# Patient Record
Sex: FEMALE | Race: WHITE | Employment: UNEMPLOYED | URBAN - METROPOLITAN AREA
[De-identification: names, ages, dates, MRNs, and addresses within clinical notes are randomized per-mention and may not be internally consistent; named-entity substitution may affect disease eponyms.]

---

## 2017-08-24 ENCOUNTER — HOSPITAL ENCOUNTER (OUTPATIENT)
Dept: ULTRASOUND IMAGING | Age: 37
Discharge: HOME | End: 2017-08-24
Attending: INTERNAL MEDICINE
Payer: MEDICARE

## 2017-08-24 DIAGNOSIS — R74.8 ELEVATED LIVER ENZYMES: ICD-10-CM

## 2017-08-24 PROCEDURE — 0346T US ABD LTD W ELASTOGRAPHY: CPT

## 2022-11-08 RX ORDER — BUPROPION HYDROCHLORIDE 300 MG/1
300 TABLET ORAL DAILY
COMMUNITY

## 2022-11-08 NOTE — PERIOP NOTES
[address] [place] [address]  SURGICAL PRE-ADMISSION INSTRUCTIONS    ARRIVAL  You will be called the day before your surgery with your expected arrival time. Sign in at the  of the hospital.  You will be directed to the Surgical Waiting Room. Please arrive at your scheduled appointment time. You have been scheduled to arrive for your procedure one or two hours prior to the expected start time of your procedure. Every effort will be made to minimize your wait but please be aware that unforeseen circumstances may affect our schedule. EATING  DO NOT EAT OR DRINK ANYTHING AFTER MIDNIGHT ON THE EVENING BEFORE YOUR SURGERY OR ON THE DAY OF YOUR SURGERY except for your medications (as instructed) with a sip of water. Do not use gum, mints or lozenges on the morning of your surgery. Please do not smoke or chew tobacco before your surgery. MEDICATIONS   Carvedilol, Depakote, Prilosec    STOP THESE MEDICATIONS AT THE TIMES LISTED BELOW  none     DRIVING/TRANSPORATION  Have a responsible adult to drive you home from the hospital and to stay with you over night. Please have them plan to remain in the hospital during your surgery. Your surgery will not be done if you do not have a responsible adult to take you home and to stay with you. If you have arranged for public transport, you must have a responsible adult to ride with you (who is not the ). You may not drive for 24 hours after anesthesia. PREPARATION  If you have a Living WiIl/Advance Directive, please bring a copy with you to scan into your chart. Please DO NOT wear makeup or nail polish  Please leave valuables at home,  DO NOT wear jewelry. Wear loose, comfortable clothing that is large enough to cover a bulky dressing.     SPECIAL INSTRUCTIONS:  none    Reviewed above preoperative instructions and answered questions by phone interview    Patient:  Nelida Luis   Date:     November 8, 2022  Time:   11:17 AM    RN: Ulysses Sil, RN    Date:     November 8, 2022  Time:   11:17 AM

## 2022-11-13 PROBLEM — N87.1 HIGH GRADE SQUAMOUS INTRAEPITHELIAL LESION (HGSIL), GRADE 2 CIN, ON BIOPSY OF CERVIX: Status: ACTIVE | Noted: 2022-11-13

## 2022-11-14 ENCOUNTER — ANESTHESIA EVENT (OUTPATIENT)
Dept: SURGERY | Age: 42
End: 2022-11-14
Payer: MEDICARE

## 2022-11-14 ENCOUNTER — HOSPITAL ENCOUNTER (OUTPATIENT)
Age: 42
Setting detail: OUTPATIENT SURGERY
Discharge: HOME | End: 2022-11-14
Attending: OBSTETRICS & GYNECOLOGY | Admitting: OBSTETRICS & GYNECOLOGY
Payer: MEDICARE

## 2022-11-14 ENCOUNTER — ANESTHESIA (OUTPATIENT)
Dept: SURGERY | Age: 42
End: 2022-11-14
Payer: MEDICARE

## 2022-11-14 VITALS
WEIGHT: 160 LBS | OXYGEN SATURATION: 97 % | SYSTOLIC BLOOD PRESSURE: 101 MMHG | TEMPERATURE: 98 F | HEART RATE: 77 BPM | BODY MASS INDEX: 29.44 KG/M2 | HEIGHT: 62 IN | DIASTOLIC BLOOD PRESSURE: 48 MMHG | RESPIRATION RATE: 20 BRPM

## 2022-11-14 LAB — HCG UR QL: NEGATIVE

## 2022-11-14 PROCEDURE — 2709999900 HC NON-CHARGEABLE SUPPLY: Performed by: OBSTETRICS & GYNECOLOGY

## 2022-11-14 PROCEDURE — 74011250636 HC RX REV CODE- 250/636: Performed by: OBSTETRICS & GYNECOLOGY

## 2022-11-14 PROCEDURE — 77030020268 HC MISC GENERAL SUPPLY: Performed by: OBSTETRICS & GYNECOLOGY

## 2022-11-14 PROCEDURE — 74011250636 HC RX REV CODE- 250/636: Performed by: ANESTHESIOLOGY

## 2022-11-14 PROCEDURE — 77030010432 HC ELECTRD BALL COVD -B: Performed by: OBSTETRICS & GYNECOLOGY

## 2022-11-14 PROCEDURE — 74011000250 HC RX REV CODE- 250: Performed by: OBSTETRICS & GYNECOLOGY

## 2022-11-14 PROCEDURE — 77030040361 HC SLV COMPR DVT MDII -B: Performed by: OBSTETRICS & GYNECOLOGY

## 2022-11-14 PROCEDURE — 81025 URINE PREGNANCY TEST: CPT

## 2022-11-14 PROCEDURE — 76010000138 HC OR TIME 0.5 TO 1 HR: Performed by: OBSTETRICS & GYNECOLOGY

## 2022-11-14 PROCEDURE — 76060000032 HC ANESTHESIA 0.5 TO 1 HR: Performed by: OBSTETRICS & GYNECOLOGY

## 2022-11-14 PROCEDURE — 77030040922 HC BLNKT HYPOTHRM STRY -A: Performed by: OBSTETRICS & GYNECOLOGY

## 2022-11-14 PROCEDURE — 76210000006 HC OR PH I REC 0.5 TO 1 HR: Performed by: OBSTETRICS & GYNECOLOGY

## 2022-11-14 PROCEDURE — 88307 TISSUE EXAM BY PATHOLOGIST: CPT

## 2022-11-14 PROCEDURE — 88305 TISSUE EXAM BY PATHOLOGIST: CPT

## 2022-11-14 PROCEDURE — 77030013620: Performed by: OBSTETRICS & GYNECOLOGY

## 2022-11-14 RX ORDER — BUPIVACAINE HYDROCHLORIDE AND EPINEPHRINE 2.5; 5 MG/ML; UG/ML
20 INJECTION, SOLUTION EPIDURAL; INFILTRATION; INTRACAUDAL; PERINEURAL ONCE
Status: DISCONTINUED | OUTPATIENT
Start: 2022-11-14 | End: 2022-11-14 | Stop reason: HOSPADM

## 2022-11-14 RX ORDER — TRAZODONE HYDROCHLORIDE 100 MG/1
100 TABLET ORAL
COMMUNITY
Start: 2022-09-27

## 2022-11-14 RX ORDER — FUROSEMIDE 20 MG/1
20 TABLET ORAL DAILY
COMMUNITY

## 2022-11-14 RX ORDER — MIDAZOLAM HYDROCHLORIDE 1 MG/ML
INJECTION, SOLUTION INTRAMUSCULAR; INTRAVENOUS
Status: DISCONTINUED
Start: 2022-11-14 | End: 2022-11-14 | Stop reason: HOSPADM

## 2022-11-14 RX ORDER — MIDAZOLAM HYDROCHLORIDE 1 MG/ML
INJECTION, SOLUTION INTRAMUSCULAR; INTRAVENOUS AS NEEDED
Status: DISCONTINUED | OUTPATIENT
Start: 2022-11-14 | End: 2022-11-14 | Stop reason: HOSPADM

## 2022-11-14 RX ORDER — BUPIVACAINE HYDROCHLORIDE 2.5 MG/ML
10 INJECTION, SOLUTION EPIDURAL; INFILTRATION; INTRACAUDAL ONCE
Status: COMPLETED | OUTPATIENT
Start: 2022-11-14 | End: 2022-11-14

## 2022-11-14 RX ORDER — FENTANYL CITRATE 50 UG/ML
INJECTION, SOLUTION INTRAMUSCULAR; INTRAVENOUS
Status: DISCONTINUED
Start: 2022-11-14 | End: 2022-11-14 | Stop reason: HOSPADM

## 2022-11-14 RX ORDER — SODIUM CHLORIDE 0.9 % (FLUSH) 0.9 %
5-40 SYRINGE (ML) INJECTION EVERY 8 HOURS
Status: DISCONTINUED | OUTPATIENT
Start: 2022-11-14 | End: 2022-11-14 | Stop reason: HOSPADM

## 2022-11-14 RX ORDER — SODIUM CHLORIDE, SODIUM LACTATE, POTASSIUM CHLORIDE, CALCIUM CHLORIDE 600; 310; 30; 20 MG/100ML; MG/100ML; MG/100ML; MG/100ML
25 INJECTION, SOLUTION INTRAVENOUS CONTINUOUS
Status: DISCONTINUED | OUTPATIENT
Start: 2022-11-14 | End: 2022-11-14 | Stop reason: HOSPADM

## 2022-11-14 RX ORDER — KETOROLAC TROMETHAMINE 15 MG/ML
INJECTION, SOLUTION INTRAMUSCULAR; INTRAVENOUS
Status: DISCONTINUED
Start: 2022-11-14 | End: 2022-11-14 | Stop reason: HOSPADM

## 2022-11-14 RX ORDER — FENTANYL CITRATE 50 UG/ML
INJECTION, SOLUTION INTRAMUSCULAR; INTRAVENOUS AS NEEDED
Status: DISCONTINUED | OUTPATIENT
Start: 2022-11-14 | End: 2022-11-14 | Stop reason: HOSPADM

## 2022-11-14 RX ORDER — SODIUM CHLORIDE 0.9 % (FLUSH) 0.9 %
5-40 SYRINGE (ML) INJECTION AS NEEDED
Status: DISCONTINUED | OUTPATIENT
Start: 2022-11-14 | End: 2022-11-14 | Stop reason: HOSPADM

## 2022-11-14 RX ORDER — PROPOFOL 10 MG/ML
INJECTION, EMULSION INTRAVENOUS
Status: DISCONTINUED | OUTPATIENT
Start: 2022-11-14 | End: 2022-11-14 | Stop reason: HOSPADM

## 2022-11-14 RX ORDER — OMEPRAZOLE 20 MG/1
20 CAPSULE, DELAYED RELEASE ORAL DAILY
COMMUNITY
Start: 2022-10-24

## 2022-11-14 RX ORDER — KETOROLAC TROMETHAMINE 15 MG/ML
30 INJECTION, SOLUTION INTRAMUSCULAR; INTRAVENOUS
Status: COMPLETED | OUTPATIENT
Start: 2022-11-14 | End: 2022-11-14

## 2022-11-14 RX ORDER — PROPOFOL 10 MG/ML
INJECTION, EMULSION INTRAVENOUS
Status: DISCONTINUED
Start: 2022-11-14 | End: 2022-11-14 | Stop reason: HOSPADM

## 2022-11-14 RX ADMIN — PROPOFOL 150 MCG/KG/MIN: 10 INJECTION, EMULSION INTRAVENOUS at 10:11

## 2022-11-14 RX ADMIN — MIDAZOLAM 2 MG: 1 INJECTION INTRAMUSCULAR; INTRAVENOUS at 10:07

## 2022-11-14 RX ADMIN — KETOROLAC TROMETHAMINE 30 MG: 15 INJECTION, SOLUTION INTRAMUSCULAR; INTRAVENOUS at 11:15

## 2022-11-14 RX ADMIN — FENTANYL CITRATE 50 MCG: 50 INJECTION, SOLUTION INTRAMUSCULAR; INTRAVENOUS at 10:09

## 2022-11-14 RX ADMIN — SODIUM CHLORIDE, POTASSIUM CHLORIDE, SODIUM LACTATE AND CALCIUM CHLORIDE 25 ML/HR: 600; 310; 30; 20 INJECTION, SOLUTION INTRAVENOUS at 09:04

## 2022-11-14 RX ADMIN — FENTANYL CITRATE 50 MCG: 50 INJECTION, SOLUTION INTRAMUSCULAR; INTRAVENOUS at 10:08

## 2022-11-14 NOTE — OP NOTES
[facility]  OPERATIVE REPORT    Name:  Sergey Denis  MR#:  [id number]  :  1980  ACCOUNT #:  [de-identified]  DATE OF SERVICE:  2022    PREOPERATIVE DIAGNOSIS:  Cervical intraepithelial neoplasia II. POSTOPERATIVE DIAGNOSIS:  Cervical intraepithelial neoplasia II. PROCEDURE PERFORMED:  LEEP procedure of cervix and endocervical curettage. SURGEON:  Yannick Betancourt MD    ASSISTANT:  None. ANESTHESIA:  MAC/paracervical block utilizing 0.25% Marcaine without epinephrine. COMPLICATIONS:  None. SPECIMENS REMOVED:  1.  Endocervical curettings. 2.  Ectocervical cone suture at 12 o'clock. 3.  Left lower quadrant ectocervical cone suture at 7 o'clock. 4.  Upper endocervical cone suture at 12 o'clock. 5.  Lower endocervical cone suture at 6 o'clock. IMPLANTS:  None. ESTIMATED BLOOD LOSS:  Minimal.    CONSULTS:  None. DRAINS:  None. FINDINGS:  No loss of staining with application of Lugol's. INDICATIONS:  At the office at our preoperative visit, we discussed the risks of surgery including infection; bleeding; damage to bowel, bladder, and adjacent organs secondary to the LEEP procedure. We discussed risk of nerve injury secondary to leg placement. We discussed the rare risk of death. We discussed limitations in that the disease can extend beyond the cone edges or beyond the area of staining from Lugol's. Alternatives were discussed. Questions were answered and permit was signed. PROCEDURE:  The patient was taken to the operating room and placed on the operating table. MAC anesthesia was administered and she was gotten into a comfortable position utilizing the Yellofin leg holders putting her in dorsal lithotomy position. She was prepped with Betadine vaginally, vulvar, and perineally, and draped in a normal fashion for this procedure. LEEP speculum was placed. The LEEP vaginal wall retractors were placed.   Lugol's was then used to stain the cervix with the above-noted findings and then utilizing the 20 x 10 loop, LEEP performed ectocervical loop. The left lower quadrant of the cervix as the surgeon viewed needed further extension, so that same loop was used in that area and this was tagged with a suture at 7 o'clock. Utilizing a 10 x 10 loop, LEEP was performed of the endocervix. It was necessary to extend to the lower portion of the endocervical canal utilizing second pass with the 10 x 10 loop. These were sent separately. Endocervical curettage was performed, and the procedure was ended. Anesthesia reversed. She was undraped and retractors removed and carefully taken out of dorsal lithotomy position and ultimately taken to the PACU in stable condition. Needle, sponge, and instrument counts correct x2.       Endy Ayoub MD      [id number]  D:  11/14/2022 11:04  T:  11/14/2022 17:11  JOB #:  [id number]  CC:  Terrea Meckel, MD

## 2022-11-14 NOTE — ANESTHESIA PREPROCEDURE EVALUATION
Relevant Problems   No relevant active problems       Anesthetic History               Review of Systems / Medical History      Pulmonary                   Neuro/Psych              Cardiovascular              Pacemaker      Comments: History of LVEF 15% many years ago  Now 50% EF   Fair activity level   GI/Hepatic/Renal                Endo/Other             Other Findings              Physical Exam    Airway  Mallampati: II           Cardiovascular    Rhythm: regular           Dental         Pulmonary  Breath sounds clear to auscultation               Abdominal         Other Findings            Anesthetic Plan    ASA: 3  Anesthesia type: MAC          Induction: Intravenous  Anesthetic plan and risks discussed with: Patient

## 2022-11-14 NOTE — BRIEF OP NOTE
Brief Postoperative Note    Patient: Angie Callejas  YOB: 1980  MRN: [id number]    Date of Procedure: 11/14/2022     Pre-Op Diagnosis: Cervical dysplasia [N87.9]    Post-Op Diagnosis: Same as preoperative diagnosis. Procedure(s):  LOOP ELECTRODE EXCISION PROCEDURE OF CERVIX (LEEP) (ANES. CHOICE)    Surgeon(s):  Consuelo Maddox MD    Surgical Assistant: None    Anesthesia: MAC, paracervical block using 10 mL of 0.25% marcaine without epinephrine    Estimated Blood Loss (mL): Minimal    Complications: None    Specimens:   ID Type Source Tests Collected by Time Destination   1 : Endocervial curettings Preservative Cervical  Consuelo Maddox MD 11/14/2022 1042 Pathology   2 : ectocervical suture @12  Preservative Cervical  Consuelo Maddox MD 11/14/2022 1050 Pathology    3. Llq as surgeon looks at specimen, suture 7 o'clock  4 upper endocervical cone suture at 12 o'clock     5.  Lower endocervical cone, suture at 6 o'clock    Implants: * No implants in log *    Drains: * No LDAs found *    Findings: no loss of staining    Dictation confirmation number [id number]    Electronically Signed by Roseanna Dodd MD on 11/14/2022 at 10:57 AM

## 2022-11-14 NOTE — PROGRESS NOTES
I saw patient prior to procedure today. She is doing well without complaint, no change in PMH, PSH, Allergies, Medications, ROS or exam.  She is in agreement with planned procedure today. Questions answered. The patient was counseled at length about the risks of golden Covid-19 during their perioperative period and any recovery window from their procedure. The patient was made aware that golden Covid-19  may worsen their prognosis for recovering from their procedure and lend to a higher morbidity and/or mortality risk. All material risks, benefits, and reasonable alternatives including postponing the procedure were discussed. The patient does  wish to proceed with the procedure at this time.

## 2022-11-14 NOTE — H&P
Chief Complaint  CESAR 2 to 3 @ 12 o'clock    HPI  Rm 1  Patient presents for LEEP as treatment and further diagnosis of CESAR 2 to 3 @ 12 o'clock of the cervix found at colposcopy    8/2021 LGSIL HPV (+) 16(+) 18/45 (-) -> 10/2021 Colpol CESAR 2 10 o'clock CESAR 2-3 12 o'clock ECC (-) -> LEEP 11/14/22 9:30am RGJERROD Benavides per anesthesia  ROS  Additionally reports: Except as noted in the HPI, the review of systems is negative for General, Breast, , Resp, GI, CV, Endo, MS, Psych and Heme. Patient's Care Team  OB/GYN: Lupe Angeles MD (VIRTUAL VISITS AVAILABLE): [address] [place], [place] [address] Way, Ph [phone], Fax [phone] NPI: [id number]  Primary Care Provider: Matteo Angelo DO: Tello Lomeli [address] [place], [place] [address]adryan Rosario, Ph [phone], Fax [phone] NPI: [id number]  Patient's Pharmacies  [address] [place] [address]): [facility], [address] [place], [place] [address]adryan Azevedoaram, Ph [phone], Fax [phone]  Wt: 181 lbs (82.1 kg) 11/03/2022 10:32 am  BP: 122/80 11/03/2022 10:35 am    Allergies  Allergies reviewed (last reviewed 10/18/2021)  NKDA    Medications  Reviewed Medications  Coreg 25 mg tablet  Take 1 tablet(s) twice a day by oral route. 08/04/21   entered Julio Rivera  Depakote 125 mg tablet,delayed release  Take 2 tablet(s) twice a day by oral route. 08/04/21   entered Julio Rivera  Lasix 20 mg tablet  Take 1 tablet(s) every day by oral route. 08/04/21   entered Julio Rivera  lisinopriL 20 mg tablet  Take 1 tablet(s) every day by oral route. 08/04/21   entered Julio Rivera  norethindrone (contraceptive) 0.35 mg tablet  Take 1 tablet(s) every day by oral route. 08/04/21   prescribed Bonita Francois CNM  ondansetron 4 mg disintegrating tablet  1 tab sl q8 hr prn nausea  10/15/21   prescribed Lisbet Chao MD  PROzac  11/03/22   entered Jan Burkett  traZODone 100 mg tablet  Take 1 tablet(s) every day by oral route.   08/04/21   entered Julio Rivera  Wellbutrin  mg 24 hr tablet, extended release  Take 1 tablet(s) every day by oral route. 08/04/21   entered [facility] [address] [place]    Vaccines  Vaccines reviewed (last reviewed 10/18/2021)  Vaccine Type Date Amt. Route Site Nuha Moss Lot # Mfr. Exp.   Date VIS VIS  Given Vaccinator  Diphtheria, Tetanus, Pertussis  Tdap 07/24/13     [id number]       Influenza  influenza, injectable, quadrivalent 09/15/17     [id number]       influenza, injectable, quadrivalent, preservative free 01/20/17     [id number]       influenza 10/23/14     [id number]       influenza, seasonal, injectable 01/15/14     [id number]       influenza, seasonal, injectable 01/25/13            influenza 10/20/11      AC       novel influenza-H1N1-09 11/04/09     [id number]       Pneumococcal  pneumococcal polysaccharide PPV23 07/24/13     [id number]       Problems  Reviewed Problems  Cervicovaginal cytology: High grade squamous intraepithelial lesion or carcinoma - Onset: 11/03/2022 - 8/2021 LGSIL HPV (+) 16(+) 18/45 (-) -> 10/2021 Colpol CESAR 2 10 o'clock CESAR 2-3 12 o'clock ECC (-) -> LEEP ___    LEEP [id number] 9:30am RGJERROD Benavides per anesthesia    Family History  Discussed Family History  Maternal Grandmother - Malignant tumor of breast (onset age: 79)  Paternal Grandfather - Malignant tumor of prostate  \"a lot of uterine problems\"    Social History  Discussed Social History  Substance Use  Do you or have you ever smoked tobacco?: Current some days smoker  How much tobacco do you smoke?: (Notes: 2 cig/day)  Do you or have you ever used any other forms of tobacco or nicotine?: No  What was the date of your most recent tobacco screening?: 08/04/2021  What is your level of alcohol consumption?: Moderate  Do you use any illicit or recreational drugs?: No  Marriage and Sexuality  How many children do you have?: 3    Surgical History  Reviewed Surgical History  Cryotherapy of cervix - 1997  External ventricular defibrillation  Dilation and Curettage - x2    GYN History  Reviewed GYN History  Date of LMP: 10/01/2021. Menses Monthly: Y. Date of Last Pap Smear: 2021 (Notes: LSIL). Date of HPV testin2021 (Notes: Positive, 16 (+), 18/45 (-)). Abnormal Pap: Yes (Notes:  - dysplasia, ,cryosurgery . ......  - dysplasia . ...... . 2021 LGSIL HPV (+) 16(+) 18/45 (-) -> 10/2021 Colpol ___). Any Treatment for Abnormal Pap?: Y (Notes:  - cryosurgery). Age at Menarche: 14.  Sexually Active?: N.  STIs/STDs: Yes (Notes: HSV, HPV). Current Birth Control Method: BCPs. Obstetric History  Reviewed Obstetric History  TOTAL FULL PRE AB. I AB. S ECTOPICS MULTIPLE LIVING  5 3   2   3  SAB x2,  x3    Past Medical History  Discussed Past Medical History  Anxiety Disorder: Y  Cardiovascular Disease: Y - enlarged heart  Depression: Y    Physical Exam  Constitutional: General Appearance: well developed and nourished and pleasant. Level of Distress: no acute distress. Ambulation: ambulating normally. Head: Head: normocephalic. Eyes: Extraocular Movements extraocular movements intact. Ears, Nose, Mouth, Throat: Ears normal hearing. Nose: no external nose lesions. Neck: Appearance FROM and supple. Thyroid: non-tender and no enlargement. Lungs / Chest: Respiratory effort: unlabored. Inspection / Palpation: no deformity, tenderness, or swelling. Auscultation: no wheezing or rales/crackles. Percussion: no dullness or hyperresonance. Cardiovascular: Rate And Rhythm: regular. Heart Sounds: no murmurs. Extremities: no cyanosis or edema. Abdomen: Inspection and Palpation: no tenderness, guarding, masses, rebound tenderness, or CVA tenderness and soft and non-distended. Liver: non-tender; no masses. Spleen: no masses. Hernia: none palpable. Female : External genitalia: no lesions, rash, or erythema. Vagina: no discharge, mass, or tenderness. Cervix: no discharge or cervical lesion. Uterus: midline, non-tender, no mass, and not enlarged. Adnexae: no adnexal mass or tenderness.  Bladder and Urethra: no urethral discharge or mass. Lymphatics: Inguinal no inguinal lymphadenopathy. Skin: General Skin no rash or suspicious lesions. Neurologic: Gait and Station: normal gait. Sensation: grossly intact. Motor: grossly intact. Mental Status Exam: Orientation oriented to person, place, and time. Assessment / Plan  1. Cervicovaginal cytology: High grade squamous intraepithelial lesion or carcinoma -  plan: LEEP      Discussed the procedure, reasons for the procedure and the risks and benefits of the procedure including infection, bleeding, transfusion risk (including infection, transfusion reaction or mismatch reaction), damage to bowel, bladder, nerves (due to incision or leg placement) and the rare risk of death with anesthesia or surgery. Discussed Limitations of the procedure including abnormal tissue extending beyond the cone margins despite      Questions answered, will proceed with the surgery.     R87.613: High grade squamous intraepithelial lesion on cytologic smear of cervix (HGSIL)      Return to Office  Denver Porter MD for [facility] at [facility] on 11/14/2022 at 09:30 AM  Denver Porter MD for Post Op Exam at [address] [place] [address] on 11/29/2022 at 11:00 AM

## 2022-11-14 NOTE — DISCHARGE INSTRUCTIONS
[address], [address], [address] [facility]    Outpatient Surgery Discharge Instructions      It is important that you take the medication exactly as they are prescribed. Do not take other medications without consulting your doctor. Activity:  No lifting straining or exertional activities for 2 weeks. You may take a shower tomorrow. No driving for 1-2 days or while taking narcotics for pain. Do not return to work for 1 week postop, if want/need to return to work sooner please call the [employer] office. Nothing in vagina for 3 weeks      Recommended Diet: Regular Diet     Follow-Up Care:  2 weeks with Dr. Emma Haywood as scheduled. [phone]    Additional Information:  If you experience any of the following symptoms then please call me or return to the emergency room if you cannot contact your doctor:   Increased vaginal bleeding  Fever  Chills  Nausea  Vomiting  Diarrhea  Change in mentation  Falling  Bleeding  Shortness of breath

## 2022-11-16 NOTE — ANESTHESIA POSTPROCEDURE EVALUATION
Procedure(s):  LOOP ELECTRODE EXCISION PROCEDURE OF CERVIX (LEEP) (ANES. CHOICE). MAC    Anesthesia Post Evaluation      Multimodal analgesia: multimodal analgesia used between 6 hours prior to anesthesia start to PACU discharge  Patient location during evaluation: bedside  Patient participation: complete - patient participated  Level of consciousness: awake  Pain score: 0  Airway patency: patent  Anesthetic complications: no  Cardiovascular status: acceptable  Respiratory status: acceptable  Hydration status: acceptable  Post anesthesia nausea and vomiting:  none  Final Post Anesthesia Temperature Assessment:  Normothermia (36.0-37.5 degrees C)      INITIAL Post-op Vital signs:   Vitals Value Taken Time   /35 11/14/22 1125   Temp     Pulse 85 11/14/22 1127   Resp 20 11/14/22 1125   SpO2 93 % 11/14/22 1127   Vitals shown include unvalidated device data.

## 2023-05-19 RX ORDER — LORAZEPAM 0.5 MG/1
0.5 TABLET ORAL 2 TIMES DAILY PRN
COMMUNITY
Start: 2016-11-21

## 2023-05-19 RX ORDER — BUPROPION HYDROCHLORIDE 300 MG/1
300 TABLET ORAL DAILY
COMMUNITY

## 2023-05-19 RX ORDER — FUROSEMIDE 20 MG/1
20 TABLET ORAL DAILY
COMMUNITY

## 2023-05-19 RX ORDER — DIVALPROEX SODIUM 250 MG/1
250 TABLET, DELAYED RELEASE ORAL 2 TIMES DAILY
COMMUNITY

## 2023-05-19 RX ORDER — TRAMADOL HYDROCHLORIDE 50 MG/1
50 TABLET ORAL EVERY 6 HOURS PRN
COMMUNITY
Start: 2014-10-09

## 2023-05-19 RX ORDER — BACLOFEN 20 MG/1
20 TABLET ORAL 3 TIMES DAILY
COMMUNITY
Start: 2016-11-21

## 2023-05-19 RX ORDER — PREDNISONE 10 MG/1
TABLET ORAL
COMMUNITY
Start: 2014-10-09

## 2023-05-19 RX ORDER — HYDROCODONE BITARTRATE AND ACETAMINOPHEN 5; 325 MG/1; MG/1
TABLET ORAL
COMMUNITY
Start: 2013-10-07

## 2023-05-19 RX ORDER — CARVEDILOL 25 MG/1
25 TABLET ORAL 2 TIMES DAILY WITH MEALS
COMMUNITY

## 2023-05-19 RX ORDER — OMEPRAZOLE 20 MG/1
20 CAPSULE, DELAYED RELEASE ORAL DAILY
COMMUNITY
Start: 2022-10-24

## 2023-05-19 RX ORDER — TRAZODONE HYDROCHLORIDE 100 MG/1
100 TABLET ORAL
COMMUNITY
Start: 2022-09-27

## 2023-05-19 RX ORDER — SERTRALINE HYDROCHLORIDE 100 MG/1
100 TABLET, FILM COATED ORAL DAILY
COMMUNITY

## (undated) DEVICE — SYRINGE MED 10ML TRNSLUC BRL PLUNG BLK MRK POLYPR CTRL

## (undated) DEVICE — LINER,SEMI-RIGID,3000CC,50EA/CS: Brand: MEDLINE

## (undated) DEVICE — GLOVE ORANGE PI 8   MSG9080

## (undated) DEVICE — Device: Brand: RADIUS LOOP ELECTRODES

## (undated) DEVICE — INTENDED USE FOR SURGICAL MARKING ON INTACT SKIN, ALSO PROVIDES A PERMANENT METHOD OF IDENTIFYING OBJECTS IN THE OPERATING ROOM: Brand: WRITESITE® REGULAR TIP SKIN MARKER

## (undated) DEVICE — ELECTRODE ES L2.1IN DIA3/16IN S STL BALL EXT BLDE DISP

## (undated) DEVICE — INFECTION CONTROL KIT SYS

## (undated) DEVICE — Device

## (undated) DEVICE — GARMENT,MEDLINE,DVT,INT,THIGH,M, GEN2: Brand: MEDLINE

## (undated) DEVICE — GLOVE ORANGE PI 7 1/2   MSG9075

## (undated) DEVICE — TUBING, SUCTION, 1/4" X 10', STRAIGHT: Brand: MEDLINE

## (undated) DEVICE — 1230 DOUBLE MAGNET NEEDLE COUNTER,BLACK: Brand: DEVON

## (undated) DEVICE — INTENDED FOR TISSUE SEPARATION, AND OTHER PROCEDURES THAT REQUIRE A SHARP SURGICAL BLADE TO PUNCTURE OR CUT.: Brand: BARD-PARKER SAFETY BLADES SIZE 11, STERILE

## (undated) DEVICE — GOWN,REINFORCED,POLY,AURORA,XLARGE,STRL: Brand: MEDLINE

## (undated) DEVICE — LEGGINGS, PAIR, 31X48, STERILE: Brand: MEDLINE

## (undated) DEVICE — X-RAY DETECTABLE SPONGES,16 PLY: Brand: VISTEC

## (undated) DEVICE — NEEDLE HYPO 18GA L1.5IN PNK POLYPR HUB S STL THN WALL FILL

## (undated) DEVICE — VAGINAL PREP TRAY: Brand: MEDLINE INDUSTRIES, INC.

## (undated) DEVICE — PENCIL ES L3M BTTN SWCH S STL HEX LOK BLDE ELECTRD HOLSTER

## (undated) DEVICE — PAD,NON-ADHERENT,3X8,STERILE,LF,1/PK: Brand: MEDLINE

## (undated) DEVICE — SWAB PROCTO NS 16IN -- MEDICHOICE

## (undated) DEVICE — SOLUTION IRRIG 1000ML 0.9% SOD CHL USP POUR PLAS BTL

## (undated) DEVICE — BLANKET WRM AD PREM MISTRAL-AIR

## (undated) DEVICE — PERI/GYN PACK: Brand: CONVERTORS

## (undated) DEVICE — DRAPE SHT 3 QTR PROXIMA 53X77 --

## (undated) DEVICE — PAD,SANITARY,11 IN,MAXI,N-STRL,IND WRAP: Brand: MEDLINE

## (undated) DEVICE — YANKAUER,TAPERED BULBOUS TIP,W/O VENT: Brand: MEDLINE

## (undated) DEVICE — NEEDLE HYPO 25GA L1.5IN BLU POLYPR HUB S STL REG BVL STR